# Patient Record
Sex: FEMALE | Race: WHITE | NOT HISPANIC OR LATINO
[De-identification: names, ages, dates, MRNs, and addresses within clinical notes are randomized per-mention and may not be internally consistent; named-entity substitution may affect disease eponyms.]

---

## 2017-01-20 ENCOUNTER — RECORD ABSTRACTING (OUTPATIENT)
Age: 22
End: 2017-01-20

## 2017-01-20 DIAGNOSIS — Z80.3 FAMILY HISTORY OF MALIGNANT NEOPLASM OF BREAST: ICD-10-CM

## 2017-01-20 DIAGNOSIS — K21.9 GASTRO-ESOPHAGEAL REFLUX DISEASE W/OUT ESOPHAGITIS: ICD-10-CM

## 2017-01-20 DIAGNOSIS — Z12.72 ENCOUNTER FOR SCREENING FOR MALIGNANT NEOPLASM OF VAGINA: ICD-10-CM

## 2017-01-20 DIAGNOSIS — Z78.9 OTHER SPECIFIED HEALTH STATUS: ICD-10-CM

## 2017-01-20 DIAGNOSIS — F17.200 NICOTINE DEPENDENCE, UNSPECIFIED, UNCOMPLICATED: ICD-10-CM

## 2018-07-05 ENCOUNTER — LABORATORY RESULT (OUTPATIENT)
Age: 23
End: 2018-07-05

## 2018-07-05 ENCOUNTER — OUTPATIENT (OUTPATIENT)
Dept: OUTPATIENT SERVICES | Facility: HOSPITAL | Age: 23
LOS: 1 days | Discharge: HOME | End: 2018-07-05

## 2018-07-05 ENCOUNTER — APPOINTMENT (OUTPATIENT)
Dept: OBGYN | Facility: CLINIC | Age: 23
End: 2018-07-05

## 2018-07-05 VITALS
WEIGHT: 116 LBS | BODY MASS INDEX: 21.9 KG/M2 | SYSTOLIC BLOOD PRESSURE: 110 MMHG | DIASTOLIC BLOOD PRESSURE: 70 MMHG | HEIGHT: 61 IN

## 2018-07-09 DIAGNOSIS — Z01.419 ENCOUNTER FOR GYNECOLOGICAL EXAMINATION (GENERAL) (ROUTINE) WITHOUT ABNORMAL FINDINGS: ICD-10-CM

## 2018-07-11 ENCOUNTER — OTHER (OUTPATIENT)
Age: 23
End: 2018-07-11

## 2018-07-19 ENCOUNTER — RESULT REVIEW (OUTPATIENT)
Age: 23
End: 2018-07-19

## 2018-07-19 LAB
C TRACH RRNA SPEC QL NAA+PROBE: NOT DETECTED
N GONORRHOEA RRNA SPEC QL NAA+PROBE: NOT DETECTED
SOURCE AMPLIFICATION: NORMAL

## 2018-08-27 ENCOUNTER — OUTPATIENT (OUTPATIENT)
Dept: OUTPATIENT SERVICES | Facility: HOSPITAL | Age: 23
LOS: 1 days | Discharge: HOME | End: 2018-08-27

## 2018-08-27 ENCOUNTER — APPOINTMENT (OUTPATIENT)
Dept: OBGYN | Facility: CLINIC | Age: 23
End: 2018-08-27

## 2018-08-27 VITALS — SYSTOLIC BLOOD PRESSURE: 110 MMHG | DIASTOLIC BLOOD PRESSURE: 70 MMHG | WEIGHT: 115 LBS

## 2018-08-27 LAB — HCG UR QL: NEGATIVE

## 2018-08-28 DIAGNOSIS — R87.612 LOW GRADE SQUAMOUS INTRAEPITHELIAL LESION ON CYTOLOGIC SMEAR OF CERVIX (LGSIL): ICD-10-CM

## 2018-09-17 ENCOUNTER — OUTPATIENT (OUTPATIENT)
Dept: OUTPATIENT SERVICES | Facility: HOSPITAL | Age: 23
LOS: 1 days | Discharge: HOME | End: 2018-09-17

## 2018-09-17 ENCOUNTER — APPOINTMENT (OUTPATIENT)
Dept: OBGYN | Facility: CLINIC | Age: 23
End: 2018-09-17

## 2018-09-17 VITALS
HEIGHT: 61 IN | DIASTOLIC BLOOD PRESSURE: 68 MMHG | BODY MASS INDEX: 21.91 KG/M2 | WEIGHT: 116.06 LBS | SYSTOLIC BLOOD PRESSURE: 110 MMHG

## 2018-09-18 DIAGNOSIS — N87.0 MILD CERVICAL DYSPLASIA: ICD-10-CM

## 2019-04-10 ENCOUNTER — OUTPATIENT (OUTPATIENT)
Dept: OUTPATIENT SERVICES | Facility: HOSPITAL | Age: 24
LOS: 1 days | Discharge: HOME | End: 2019-04-10

## 2019-04-10 DIAGNOSIS — J02.9 ACUTE PHARYNGITIS, UNSPECIFIED: ICD-10-CM

## 2019-07-15 ENCOUNTER — APPOINTMENT (OUTPATIENT)
Dept: OBGYN | Facility: CLINIC | Age: 24
End: 2019-07-15

## 2019-09-25 ENCOUNTER — OUTPATIENT (OUTPATIENT)
Dept: OUTPATIENT SERVICES | Facility: HOSPITAL | Age: 24
LOS: 1 days | Discharge: HOME | End: 2019-09-25

## 2019-09-25 ENCOUNTER — APPOINTMENT (OUTPATIENT)
Dept: OBGYN | Facility: CLINIC | Age: 24
End: 2019-09-25
Payer: MEDICAID

## 2019-09-25 VITALS
HEIGHT: 60 IN | BODY MASS INDEX: 21.6 KG/M2 | SYSTOLIC BLOOD PRESSURE: 113 MMHG | HEART RATE: 82 BPM | DIASTOLIC BLOOD PRESSURE: 77 MMHG | WEIGHT: 110 LBS

## 2019-09-25 DIAGNOSIS — Z00.00 ENCOUNTER FOR GENERAL ADULT MEDICAL EXAMINATION WITHOUT ABNORMAL FINDINGS: ICD-10-CM

## 2019-09-25 PROCEDURE — 99395 PREV VISIT EST AGE 18-39: CPT

## 2019-09-25 NOTE — HISTORY OF PRESENT ILLNESS
[1 Year Ago] : 1 year ago [Good] : being in good health [Healthy Diet] : a healthy diet [Last Pap ___] : Last cervical pap smear was [unfilled] [Reproductive Age] : is of reproductive age [Sexually Active] : is sexually active [Monogamous (Male Partner)] : is monogamous with a male partner [Menstrual Problems] : reports normal menses [Contraception] : does not use contraception

## 2019-09-25 NOTE — PHYSICAL EXAM
[Awake] : awake [Alert] : alert [Soft] : soft [No Lesions] : no genitalia lesions [Normal] : uterus [Labia Majora] : labia major [Labia Minora] : labia minora [Pink Rugae] : pink rugae [Scant] : there was scant vaginal bleeding [Nulliparous] : was nulliparous [Pap Obtained] : a Pap smear was performed [Anteversion] : anteverted [Uterine Adnexae] : were not tender and not enlarged [Acute Distress] : no acute distress [Mass] : no breast mass [Nipple Discharge] : no nipple discharge [Axillary LAD] : no axillary lymphadenopathy [Tender] : non tender [Distended] : not distended [Discharge] : had no discharge [Tenderness] : nontender [Adnexa Tenderness] : were not tender [Ovarian Mass (___ Cm)] : there were no adnexal masses

## 2022-05-16 ENCOUNTER — APPOINTMENT (OUTPATIENT)
Dept: OBGYN | Facility: CLINIC | Age: 27
End: 2022-05-16
Payer: COMMERCIAL

## 2022-05-16 ENCOUNTER — NON-APPOINTMENT (OUTPATIENT)
Age: 27
End: 2022-05-16

## 2022-05-16 VITALS
BODY MASS INDEX: 22.58 KG/M2 | SYSTOLIC BLOOD PRESSURE: 109 MMHG | DIASTOLIC BLOOD PRESSURE: 77 MMHG | WEIGHT: 115 LBS | HEIGHT: 60 IN

## 2022-05-16 DIAGNOSIS — R39.9 UNSPECIFIED SYMPTOMS AND SIGNS INVOLVING THE GENITOURINARY SYSTEM: ICD-10-CM

## 2022-05-16 DIAGNOSIS — Z01.419 ENCOUNTER FOR GYNECOLOGICAL EXAMINATION (GENERAL) (ROUTINE) W/OUT ABNORMAL FINDINGS: ICD-10-CM

## 2022-05-16 PROCEDURE — 99395 PREV VISIT EST AGE 18-39: CPT

## 2022-05-16 RX ORDER — NORGESTIMATE AND ETHINYL ESTRADIOL 7DAYSX3 LO
0.18/0.215/0.25 KIT ORAL DAILY
Qty: 1 | Refills: 5 | Status: COMPLETED | COMMUNITY
Start: 2018-07-05 | End: 2022-05-16

## 2022-05-16 NOTE — HISTORY OF PRESENT ILLNESS
[FreeTextEntry1] : 27 yo G0 for annual exam. Denies any complaints today, periods are regular, not currently using OCP's\par 2018: Pap w/ LGSIL, cannot r/o ASC-H, HR HPV + --> colposcopy w/ CIN1\par 2019: Pap: NILM\par

## 2022-05-18 LAB — BACTERIA UR CULT: NORMAL

## 2022-05-27 LAB — CYTOLOGY CVX/VAG DOC THIN PREP: NORMAL

## 2023-03-16 ENCOUNTER — NON-APPOINTMENT (OUTPATIENT)
Age: 28
End: 2023-03-16

## 2023-08-01 ENCOUNTER — APPOINTMENT (OUTPATIENT)
Dept: OBGYN | Facility: CLINIC | Age: 28
End: 2023-08-01
Payer: COMMERCIAL

## 2023-08-01 VITALS
WEIGHT: 120 LBS | SYSTOLIC BLOOD PRESSURE: 95 MMHG | HEIGHT: 60 IN | BODY MASS INDEX: 23.56 KG/M2 | DIASTOLIC BLOOD PRESSURE: 60 MMHG

## 2023-08-01 PROCEDURE — 99395 PREV VISIT EST AGE 18-39: CPT

## 2023-08-01 NOTE — PHYSICAL EXAM
[Chaperone Present] : A chaperone was present in the examining room during all aspects of the physical examination [Appropriately responsive] : appropriately responsive [Soft] : soft [Non-tender] : non-tender [Non-distended] : non-distended [No Lesions] : no lesions [No Mass] : no mass [Examination Of The Breasts] : a normal appearance [No Masses] : no breast masses were palpable [Labia Majora] : normal [Labia Minora] : normal [No Bleeding] : There was no active vaginal bleeding [Normal] : normal [Uterine Adnexae] : normal

## 2023-08-01 NOTE — DISCUSSION/SUMMARY
[FreeTextEntry1] : 26 yo for [employer] -f/u pap smear - declined birth control -f/u STD testing - advised f/u w/ dermatologist for multiple pigmented lesions

## 2023-08-01 NOTE — HISTORY OF PRESENT ILLNESS
[FreeTextEntry1] : 26 yo G0 for [employer]. Denies any complaints. Periods are regular. H/o abnormal pap smear w/ colposcopy showing LGSIL followed by normal pap smear since. She is sexually active, desires STD testing today.

## 2023-08-01 NOTE — COUNSELING
[Nutrition/ Exercise/ Weight Management] : nutrition, exercise, weight management [Body Image] : body image [Vitamins/Supplements] : vitamins/supplements [Sunscreen] : sunscreen [Contraception/ Emergency Contraception/ Safe Sexual Practices] : contraception, emergency contraception, safe sexual practices [Preconception Care/ Fertility options] : preconception care, fertility options [Confidentiality] : confidentiality [STD (testing, results, tx)] : STD (testing, results, tx) [Lab Results] : lab results [Medication Management] : medication management

## 2023-08-02 LAB
HBV SURFACE AG SER QL: NONREACTIVE
HIV1+2 AB SPEC QL IA.RAPID: NONREACTIVE
T PALLIDUM AB SER QL IA: NEGATIVE

## 2023-08-03 LAB
C TRACH RRNA SPEC QL NAA+PROBE: NOT DETECTED
HCV RNA SERPL NAA+PROBE-LOG IU: NOT DETECTED LOGIU/ML
HEPC RNA INTERP: NOT DETECTED IU/ML
N GONORRHOEA RRNA SPEC QL NAA+PROBE: NOT DETECTED
SOURCE AMPLIFICATION: NORMAL

## 2023-08-04 LAB — CYTOLOGY CVX/VAG DOC THIN PREP: NORMAL

## 2025-05-02 ENCOUNTER — APPOINTMENT (OUTPATIENT)
Dept: NEUROLOGY | Facility: CLINIC | Age: 30
End: 2025-05-02
Payer: COMMERCIAL

## 2025-05-02 VITALS
WEIGHT: 132 LBS | HEIGHT: 61 IN | DIASTOLIC BLOOD PRESSURE: 80 MMHG | HEART RATE: 73 BPM | SYSTOLIC BLOOD PRESSURE: 118 MMHG | BODY MASS INDEX: 24.92 KG/M2

## 2025-05-02 PROCEDURE — 99204 OFFICE O/P NEW MOD 45 MIN: CPT

## 2025-05-02 RX ORDER — ELETRIPTAN HYDROBROMIDE 40 MG/1
40 TABLET, FILM COATED ORAL
Qty: 6 | Refills: 5 | Status: ACTIVE | COMMUNITY
Start: 2025-05-02 | End: 1900-01-01

## 2025-05-02 RX ORDER — PROPRANOLOL HYDROCHLORIDE 120 MG/1
120 CAPSULE, EXTENDED RELEASE ORAL
Qty: 30 | Refills: 4 | Status: ACTIVE | COMMUNITY
Start: 2025-05-02 | End: 1900-01-01

## 2025-05-05 DIAGNOSIS — Z86.69 PERSONAL HISTORY OF OTHER DISEASES OF THE NERVOUS SYSTEM AND SENSE ORGANS: ICD-10-CM

## 2025-05-13 ENCOUNTER — APPOINTMENT (OUTPATIENT)
Dept: MRI IMAGING | Facility: CLINIC | Age: 30
End: 2025-05-13
Payer: COMMERCIAL

## 2025-05-13 PROCEDURE — 70551 MRI BRAIN STEM W/O DYE: CPT

## 2025-06-20 ENCOUNTER — APPOINTMENT (OUTPATIENT)
Dept: NEUROLOGY | Facility: CLINIC | Age: 30
End: 2025-06-20
Payer: COMMERCIAL

## 2025-06-20 PROCEDURE — 99213 OFFICE O/P EST LOW 20 MIN: CPT

## 2025-07-09 ENCOUNTER — APPOINTMENT (OUTPATIENT)
Dept: OBGYN | Facility: CLINIC | Age: 30
End: 2025-07-09
Payer: COMMERCIAL

## 2025-07-09 ENCOUNTER — NON-APPOINTMENT (OUTPATIENT)
Age: 30
End: 2025-07-09

## 2025-07-09 VITALS
DIASTOLIC BLOOD PRESSURE: 70 MMHG | HEIGHT: 61 IN | WEIGHT: 130 LBS | SYSTOLIC BLOOD PRESSURE: 101 MMHG | BODY MASS INDEX: 24.55 KG/M2

## 2025-07-09 PROCEDURE — 99395 PREV VISIT EST AGE 18-39: CPT

## 2025-07-09 PROCEDURE — 99459 PELVIC EXAMINATION: CPT

## 2025-07-09 RX ORDER — SUMATRIPTAN 100 MG/1
100 TABLET, FILM COATED ORAL
Qty: 10 | Refills: 0 | Status: ACTIVE | COMMUNITY
Start: 2025-07-09 | End: 1900-01-01

## 2025-07-12 LAB — HPV HIGH+LOW RISK DNA PNL CVX: NOT DETECTED
